# Patient Record
Sex: MALE | Race: WHITE
[De-identification: names, ages, dates, MRNs, and addresses within clinical notes are randomized per-mention and may not be internally consistent; named-entity substitution may affect disease eponyms.]

---

## 2020-02-10 ENCOUNTER — HOSPITAL ENCOUNTER (INPATIENT)
Dept: HOSPITAL 25 - PMRU | Age: 85
LOS: 4 days | Discharge: HOME HEALTH | DRG: 57 | End: 2020-02-14
Attending: PHYSICAL MEDICINE & REHABILITATION | Admitting: PHYSICAL MEDICINE & REHABILITATION
Payer: MEDICARE

## 2020-02-10 DIAGNOSIS — N40.0: ICD-10-CM

## 2020-02-10 DIAGNOSIS — G20: Primary | ICD-10-CM

## 2020-02-10 DIAGNOSIS — Z79.82: ICD-10-CM

## 2020-02-10 DIAGNOSIS — Z95.1: ICD-10-CM

## 2020-02-10 DIAGNOSIS — J11.1: ICD-10-CM

## 2020-02-10 DIAGNOSIS — G47.00: ICD-10-CM

## 2020-02-10 DIAGNOSIS — D69.6: ICD-10-CM

## 2020-02-10 DIAGNOSIS — E78.5: ICD-10-CM

## 2020-02-10 DIAGNOSIS — Z82.49: ICD-10-CM

## 2020-02-10 DIAGNOSIS — I25.10: ICD-10-CM

## 2020-02-10 DIAGNOSIS — Z79.899: ICD-10-CM

## 2020-02-10 DIAGNOSIS — M85.80: ICD-10-CM

## 2020-02-10 PROCEDURE — F08Z1ZZ DRESSING TECHNIQUES TREATMENT: ICD-10-PCS | Performed by: PHYSICAL MEDICINE & REHABILITATION

## 2020-02-10 PROCEDURE — F07Z5ZZ BED MOBILITY TREATMENT: ICD-10-PCS | Performed by: PHYSICAL MEDICINE & REHABILITATION

## 2020-02-10 PROCEDURE — F07Z4ZZ WHEELCHAIR MOBILITY TREATMENT: ICD-10-PCS | Performed by: PHYSICAL MEDICINE & REHABILITATION

## 2020-02-10 PROCEDURE — F07Z8ZZ TRANSFER TRAINING TREATMENT: ICD-10-PCS | Performed by: PHYSICAL MEDICINE & REHABILITATION

## 2020-02-10 PROCEDURE — F08Z2ZZ GROOMING/PERSONAL HYGIENE TREATMENT: ICD-10-PCS | Performed by: PHYSICAL MEDICINE & REHABILITATION

## 2020-02-10 PROCEDURE — 85025 COMPLETE CBC W/AUTO DIFF WBC: CPT

## 2020-02-10 PROCEDURE — F08Z3ZZ FEEDING/EATING TREATMENT: ICD-10-PCS | Performed by: PHYSICAL MEDICINE & REHABILITATION

## 2020-02-10 PROCEDURE — F07Z9ZZ GAIT TRAINING/FUNCTIONAL AMBULATION TREATMENT: ICD-10-PCS | Performed by: PHYSICAL MEDICINE & REHABILITATION

## 2020-02-10 PROCEDURE — 80053 COMPREHEN METABOLIC PANEL: CPT

## 2020-02-10 PROCEDURE — F08Z0ZZ BATHING/SHOWERING TECHNIQUES TREATMENT: ICD-10-PCS | Performed by: PHYSICAL MEDICINE & REHABILITATION

## 2020-02-10 PROCEDURE — 36415 COLL VENOUS BLD VENIPUNCTURE: CPT

## 2020-02-10 RX ADMIN — CARBIDOPA AND LEVODOPA SCH TAB: 25; 100 TABLET ORAL at 17:30

## 2020-02-10 RX ADMIN — DOCUSATE SODIUM SCH: 100 CAPSULE, LIQUID FILLED ORAL at 20:10

## 2020-02-10 RX ADMIN — CARBIDOPA AND LEVODOPA SCH TAB: 25; 100 TABLET ORAL at 12:29

## 2020-02-10 RX ADMIN — Medication SCH UNITS: at 12:29

## 2020-02-10 RX ADMIN — HEPARIN SODIUM SCH UNITS: 5000 INJECTION INTRAVENOUS; SUBCUTANEOUS at 20:10

## 2020-02-10 RX ADMIN — CYANOCOBALAMIN TAB 500 MCG SCH MCG: 500 TAB at 17:30

## 2020-02-10 RX ADMIN — ATORVASTATIN CALCIUM SCH MG: 10 TABLET, FILM COATED ORAL at 20:09

## 2020-02-10 NOTE — HP
ADMISSION HISTORY AND PHYSICAL:

 

DATE OF ADMISSION:  02/10/20

 

HISTORY OF ILLNESS:  Rico Valdovinos is an 84-year-old white male.  He has a 
history of Parkinson's disease which he has had for at least the past 15 years.
  He also has a history of coronary artery disease.  The patient was admitted 
to the hospital on 02/04/20.  He was admitted and tested positive for 
influenza.  He was placed on Tamiflu, which was adjusted for his renal 
function.  He was continued on that for 5 days.  He remained on his usual 
regimen of carbidopa and levodopa but was in bed for a prolonged period of 
time.  He has baseline thrombocytopenia, which worsened with the illness but 
then seemed to improve.  He did receive IV hydration.  He was noted to have 
some difficulty with kidney function while on the acute service.  His 
creatinine is elevated at baseline but when he came in his creatinine was 1.61, 
which improved to 1.24 after hydration.  The patient was weaker than usual and 
had more balance issues because of his prolonged period of bed rest.  He was 
felt to have physical therapy and occupational therapy needs.  He is now being 
admitted for inpatient rehab so he might return to independent living.

 

PAST MEDICAL HISTORY:  Significant for Parkinson's disease.  He also has a 
history of osteopenia, hyperlipidemia, prostatic hypertrophy, and a coronary 
artery bypass graft in 2011.  He follows with Dr. Faust for his heart disease.

 

CURRENT MEDICATIONS:  Include:

1.  Sinemet.

2.  Amantadine.

3.  Baby aspirin every day.

4.  Lipitor.

 

ALLERGIES:  The patient has no known drug allergies.

 

FAMILY HISTORY:  Noncontributory.  He did have a father who had a history of 
coronary artery disease.

 

SOCIAL HISTORY:  He lives in [facility] with his wife. It is a one-
level condo. He is a retired psychologist.

 

REVIEW OF SYSTEMS:  No current shortness of breath or chest pain.

 

                               PHYSICAL EXAMINATION

 

VITAL SIGNS:  The patient's temperature is 97.6, blood pressure is 100/48, 
pulse 56, respirations 18.

 

HEENT:  He does have Parkinson-like facies.

 

NECK:  Supple.

 

LUNGS:  Sounded clear to auscultation bilaterally.

 

HEART:  Heart sounds are regular.  S1 and S2 are audible.

 

ABDOMEN:  Soft and nontender.

 

EXTREMITIES:  He did have perhaps some cogwheeling.  Peripheral pulses were 
intact.

 

NEUROLOGIC:  The patient was awake, alert, oriented.  Muscle strength appeared 
to be close to 5/5.  He did have some difficulty with tremor.

 

FUNCTIONAL EXAM:  He transfers with contact guard to min assist.

 

 ASSESSMENT:

1.  Parkinson's disease.

2.  Influenza.

 

PLAN:  We are going to integrate him into a comprehensive and therapeutic rehab 
program with the following goals:

1.  Physical Therapy will work with the patient.  They are going to work on 
functional transfer training, ambulation training with a walker, work on his 
balance.

2.  Occupational Therapy will see the patient, work on his activities of daily 
living including toileting and toilet transfers.

3.  Heparin for DVT prophylaxis.

4.  We will continue with Sinemet and his amantadine for his Parkinson's 
disease.

5.  Continue aspirin and Lipitor for coronary artery disease.

6.  His bowels will be regulated.

7.  Advance directives:  The patient has a MOLST.  He has DNR.

8.   will be closely involved to make sure that any services and 
equipment that patient requires are in place prior to discharge. 

9.  Family training as appropriate.

10.  Home with appropriate services.

 

ESTIMATED LENGTH OF STAY:  7 to 10 days.

 

 

 

[id number]/CPS #: [id number]

MO

## 2020-02-11 RX ADMIN — AMANTADINE SCH MG: 100 CAPSULE ORAL at 07:37

## 2020-02-11 RX ADMIN — DOCUSATE SODIUM SCH: 100 CAPSULE, LIQUID FILLED ORAL at 19:34

## 2020-02-11 RX ADMIN — CARBIDOPA AND LEVODOPA SCH TAB: 25; 100 TABLET ORAL at 13:06

## 2020-02-11 RX ADMIN — HEPARIN SODIUM SCH UNITS: 5000 INJECTION INTRAVENOUS; SUBCUTANEOUS at 20:44

## 2020-02-11 RX ADMIN — CYANOCOBALAMIN TAB 500 MCG SCH MCG: 500 TAB at 17:21

## 2020-02-11 RX ADMIN — ASPIRIN SCH MG: 81 TABLET, COATED ORAL at 07:38

## 2020-02-11 RX ADMIN — Medication SCH UNITS: at 13:06

## 2020-02-11 RX ADMIN — DOCUSATE SODIUM SCH: 100 CAPSULE, LIQUID FILLED ORAL at 07:39

## 2020-02-11 RX ADMIN — CARBIDOPA AND LEVODOPA SCH TAB: 25; 100 TABLET ORAL at 17:21

## 2020-02-11 RX ADMIN — HEPARIN SODIUM SCH UNITS: 5000 INJECTION INTRAVENOUS; SUBCUTANEOUS at 07:35

## 2020-02-11 RX ADMIN — CARBIDOPA AND LEVODOPA SCH TAB: 25; 100 TABLET ORAL at 07:38

## 2020-02-11 NOTE — PMRUTEAM
PMRU: Team Meeting


Current Status: 


 Physical Therapy: Current Status











Current Rolling Status         Supervision/Touching


 


Current Supine <-> Sit Status  Supervision/Touching


 


Current Sit <-> Stand Status   Partial/Moderate


 


Current Bed <-> Chair Status   Partial/Moderate


 


Transfer/Bed Mobility          Rolling Walker





Recommended Devices            


 


Current Picking Up Object      Partial/Moderate





Status                         


 


Current Car Transfer Status    Partial/Moderate


 


Current Ambulation Assistance  Partial/Moderate





Status                         


 


Ambulation Assistive Device    Rolling Walker


 


Ambulation Conditions          Two or More Turns,Uneven Surfaces


 


Current Ambulation Distance    120'


 


Current Wheelchair Propulsion  Not Applicable





Ability Status                 


 


Current Stair Climbing Status  Supervision/Touching


 


Stair Climbing Assistive       Left Railing,Right Railing





Devices                        


 


Number of Stairs Climbed       10


 


Current Curb Assistance Status Supervision/Touching














 Occupational Therapy: Current Status











Current Upper Body Dressing    Partial/Moderate





Status                         


 


Current Lower Body Dressing    Partial/Moderate





Status                         


 


Current Footwear Status        Partial/Moderate


 


Current Grooming Status        Setup or Clean-up Assist


 


Current Toileting Status       Supervision/Touching


 


Current Toilet Transfer Status Supervision/Touching


 


Current Eating Status          Setup or Clean-up Assist














 Nursing: Current Status











Skin Deviations [Bilateral     Other





Buttocks]                      


 


Skin Deviation Description [   intact





Bilateral Buttocks]            


 


Bladder Current Status         voiding in br


 


Bowel Current Status           last bm 2/10/20. declined colace


 


Nutrition Current Status       appetite good


 


Medication Current Status      no pain meds given. requests sinemet to given on





 time














 Rec Therapy: Current Status











Summary of Assessment and      Recreation therapy services will be introduce





Clinical Impression            today 2/11/20














 Nutrition: Current Status











Monitoring                     new adm; full nutrition assessment planned 2/17





 per NDS protocol, unless otherwise requested.





 Tentative nutrition goals as outlined below.

















Goals: 


 Physical Therapy:  Goals











Goals to Be Accomplished in (  7-10





Days)                          


 


Goal: Rolling Assistance       Independent


 


Goal Supine <-> Sit Status     Independent


 


Goal Sit <-> Stand Status      Independent


 


Goal Bed <-> Chair Status      Independent


 


Transfer/Bed Mobility          Rolling Walker





Recommended Devices            


 


Goal: Picking Up Object        Independent


 


Goal: Car Transfer Status      Setup or Clean-up Assist


 


Goal: Ambulation Assistance    Independent


 


Ambulation Assistive Devices   Rolling Walker


 


Ambulation Distance (ft)       150'


 


Goal: Wheelchair Propulsion    Not Applicable





Ability                        


 


Goal: Stairs Assistance        Independent


 


Stairs Recommended Devices     Two Rails


 


Number of Stairs               12


 


Goal: Curb Assistance          Independent


 


Goal: Home Exercise Program    Independent





Assistance                     














 Occupational Therapy: Goals











Goals to be Completed in (Days 7





)                              


 


Goal Upper Body Dressing       Independent





Routine                        


 


Goal Lower Body Dressing       Independent





Routine                        


 


Goal Footwear Status           Independent


 


Goal Bathing Routine (OT)      Supervision/Touching


 


Goal Grooming Routine          Independent


 


Goal Toilet Hygiene and        Independent





Clothing Management Routine    


 


Goal Toilet Transfer Routine   Independent


 


Goal Functional Transfers for  Independent





ADL                            


 


Goal Feeding Routine           Independent














 Nutrition: Goals











Intervention Goals             1.  adequate po intake to support hydration and





 lean body mass without wt loss





 2.  pt will tolerate least-restrictive diet





 texture without difficulty chewing or swallowing





 3.  maintain serum electrolytes WNL





 4.  regulation of bowel pattern; no c/o





 constipation (or diarrhea)





 














 Nursing: Goals











Bladder Goal                   independent


 


Bowel Goal                     independent.


 


Nutrition Goal                 100% of all meals consumed


 


Medication Goal                independent

















Care Plan: 


 Care Plan





ADL's - Improve/Maintain                Start:  02/10/20 11:53              


Freq:   DAILY@0700,1900                 Status: Active      Target: 02/11/20


Protocol:                                                                   








Activity Type Activity Date Activity User E-Sign Co-Sign Detail





Recorded Client Recorded Date Recorded By   


 


Document 02/10/20 15:48 [id number]   





PMRU-C09 02/10/20 15:48 [id number]   














  02/10/20





  15:48


 


PMRU Outcome: ADL's/ADL Transfers 


 


Orders/Interventions Occupational





 Therapy





 Evaluation &





 Treatment





 Communication





 Tool in Patient





 Room


 


Device Yes


 


Address Deficits Secondary To: deconditioning,





 h/o Parkinson'





 s


 


Patient to receive OT 5x/wk for  Therex





min/day Self Care





 Management





 Group Therapy





 Neuromuscular





 ReEducation


 


UE/LE ADL's with Assist Yes: Omar





 except S for





 bathing


 


ADL Transfers with Assist Yes: Omar


 


Toileting: Transfers,Clothing Management Yes: Omar





,Hygeine w/Assist 


 


Other Outcome/Goals Pt participated





 well in





 treatment





 session,





 reports





 normally using





 4# dumbells





 when working





 out, however





 reported





 feeling very





 challenged





 during session





 using 2#





 dumbells. Will





 attempt to work





 pt back to





 normal routine





 as pt able to





 tolerate.


 


Progression Toward Outcome/Goals Goal Initiation








Cardiovascular- Improve/Maintain        Start:  02/10/20 11:41              


Freq:   DAILY@0700,1900                 Status: Active      Target: 02/17/20


Protocol:                                                                   








Activity Type Activity Date Activity User E-Sign Co-Sign Detail





Recorded Client Recorded Date Recorded By   


 


Document 02/11/20 08:00 [id number]   





PMRU-C14 02/11/20 09:21 [id number]   














  02/11/20





  08:00


 


PMRU Outcome: Cardiovascular 


 


Vital Signs q Shift for 48hrs Then BID Yes


 


Daily Weight Ordered No


 


Current Cardiovascular Outcome/Goal Maintain/





 Achieve





 Baseline HR, BP





 , Perfusion





 Improve HR





 Within





 Prescribed





 Parameters





 Maintain/





 Improve





 Perfusion





 Free of





 Abnormal





 Cardiac





 Symptoms


 


Progression Toward Outcome/Goal Progressing








DVT Prophylaxis- Improve/Maintain       Start:  02/10/20 11:41              


Freq:   DAILY@0700,1900                 Status: Active      Target: 02/17/20


Protocol:                                                                   








Activity Type Activity Date Activity User E-Sign Co-Sign Detail





Recorded Client Recorded Date Recorded By   


 


Document 02/11/20 08:00 [id number]   





PMRU-C14 02/11/20 09:21 [id number]   














  02/11/20





  08:00


 


PMRU Outcome: DVT Prophylaxis 


 


Current DVT Outcome/Goals Remains Free of





 DVT





 Complies with





 DVT Prophylaxis





 /Treatment





 Demonstrates





 Knowledge of





 DVT Prevention/





 Treatment





 TEDS Stockings





 on Every AM,





 Off at HS


 


Other DVT Other Outcome/Goals heparin given





 this am per





 order


 


Progression Toward Outcome/Goals Progressing








Discharge Planning - Improve/Maintain   Start:  02/10/20 11:41              


Freq:   DAILY@0700,1900                 Status: Active      Target: 02/17/20


Protocol:                                                                   








Activity Type Activity Date Activity User E-Sign Co-Sign Detail





Recorded Client Recorded Date Recorded By   


 


Document 02/11/20 08:00 [id number]   





PMRU-C14 02/11/20 09:21 [id number]   














  02/11/20





  08:00


 


PMRU Outcome: Discharge Planning 


 


Current Discharge Planning Outcome/Goals Demonstrates





 Understanding





 of Discharge





 Plan





 Homecare





 Referral - See





 Comment


 


Progression Toward Outcome/Goals Progressing








Education-Improve/Maintain              Start:  02/10/20 11:41              


Freq:   DAILY@0700,1900                 Status: Active      Target: 02/17/20


Protocol:                                                                   








Activity Type Activity Date Activity User E-Sign Co-Sign Detail





Recorded Client Recorded Date Recorded By   


 


Document 02/11/20 08:00 [id number]   





PMRU-C14 02/11/20 09:21 [id number]   














  02/11/20





  08:00


 


PMRU Outcome: Education 


 


Current Education Outcome/Goals Demonstrate/





 Verbalize





 Understanding





 of Written





 Discharge





 Instructions





 Demonstrates





 Skills





 Encourage





 Questions


 


Progression Toward Outcome/Goals Progressing








/GI-Improve/Maintain                  Start:  02/10/20 11:41              


Freq:   DAILY@0700,1900                 Status: Active      Target: 02/17/20


Protocol:                                                                   








Activity Type Activity Date Activity User E-Sign Co-Sign Detail





Recorded Client Recorded Date Recorded By   


 


Document 02/11/20 08:00 [id number]   





PMRU-C14 02/11/20 09:21 [id number]   














  02/11/20





  08:00


 


PMRU Outcome: Genitourinary/ 





Gastrointestinal 


 


Current Gastrointestinal Outcome/Goals Maintain/





 Achieve Bowel





 Regularity in





 Accordance with





 Pt's Baseline





 Remain Free of





 Emesis





 Prevent





 Constipation





 Laxatives as





 Ordered


 


Other GI Outcome/Goals declined bowel





 meds


 


Progression Toward Outcome/Goals Progressing


 


Current Genitourinary Outcome/Goals Maintain/





 Achieve Urinary





 Continence





 Maintain/





 Achieve





 Adequate





 Urinary Output





 Remain Free of





 Hospital-





 Acquired UTI


 


Progression Toward Outcome/Goals Progressing








Medication Administration               Start:  02/10/20 11:41              


Freq:   DAILY@0700,1900                 Status: Active      Target: 02/17/20


Protocol:                                                                   








Activity Type Activity Date Activity User E-Sign Co-Sign Detail





Recorded Client Recorded Date Recorded By   


 


Document 02/11/20 08:00 [id number]   





PMRU-C14 02/11/20 09:21 [id number]   














  02/11/20





  08:00


 


PMRU Outcome: Medication Administration 


 


Assess Patient Knowledge/Teach Med Yes





Education for all Meds 


 


Current Med Admin Outcome/Goals Patient





 Independent





 with Medication





 Administration





 at Home





 Demonstrates





 Understanding


 


Progression Towards Outcome/Goals Progressing


 


Is Patient Going Home on Lovenox? No








Mobility- Improve/Maintain              Start:  02/10/20 15:35              


Freq:   DAILY@0700,1900                 Status: Active      Target: 02/11/20


Protocol:                                                                   








Activity Type Activity Date Activity User E-Sign Co-Sign Detail





Recorded Client Recorded Date Recorded By   


 


Document 02/10/20 15:35 [id number]   





PMRU-C07 02/10/20 15:36 [id number]   














  02/10/20





  15:35


 


PMRU Outcome: Mobility 


 


Physical Therapy Evaluation and Yes





Treatment 


 


Activity OOB with Assistance Yes


 


WBAT Yes


 


NWB No


 


TTWB No


 


Device Yes


 


Assistance Yes


 


Patient to be seen 5x/wk for  min/ Therex





day for: Mobility





 Training





 Gait Training





 Balance





 Other


 


Other Therapy Comment Discharge





 training,





 discharge





 planning


 


Current Mobility Outcome/Goals Maintain/





 Achieve





 Baseline





 Mobility Status





 Improve





 Mobility Status





 Demonstrates





 Proper Use of





 Assistive





 Devices





 Free from





 Complications





 of Immobility


 


Progression Toward Outcome/Goals Goal Initiation


 


Bed Mobility Yes:





 Independent


 


Transfers Yes:





 Independent





 with RW


 


Gait x ft Yes: 150'





 Independent





 with RW


 


W/C Mobility x ft No


 


Up/Down Stairs Yes: 12 with 2





 rails





 Independent


 


With HEP Yes:





 Independent








Neurological- Improve/Maintain          Start:  02/10/20 11:41              


Freq:   DAILY@0700,1900                 Status: Active      Target: 02/17/20


Protocol:                                                                   








Activity Type Activity Date Activity User E-Sign Co-Sign Detail





Recorded Client Recorded Date Recorded By   


 


Document 02/11/20 08:00 [id number]   





PMRU-C14 02/11/20 09:21 [id number]   














  02/11/20





  08:00


 


PMRU Outcome: Neurological 


 


Weakness/Aphasia Weakness


 


Current Neurological Outcome/Goals Maintain/





 Achieve





 Baseline





 Neurological





 Status





 Improve





 Neurological





 Status





 Prevent





 Avoidable





 Neurological





 Decline





 Demonstrate





 Knowledge of





 Prevention/Tx





 of Neuro





 Disorders/





 Complication





 Maintain/





 Improve





 Strength/ROM


 


Progression Toward Outcome/Goals Progressing








Rec Therapy- Improve/Maintain           Start:  02/10/20 16:47              


Freq:   DAILY@0700,1900                 Status: Active      Target: 02/11/20


Protocol:                                                                   








Activity Type Activity Date Activity User E-Sign Co-Sign Detail





Recorded Client Recorded Date Recorded By   


 


Document 02/10/20 16:47 [id number]   





BSU-C08 02/10/20 16:47 [id number]   














  02/10/20





  16:47


 


PMRU Outcome: Recreation Therapy 


 


Current Rec Ther Outcome/Goals Complete Rec





 Therapy





 Assessment





 Meet with





 Patient





 Regularly for





 Support





 Encourage





 Leisure





 Involvement


 


Progression Toward Outcome/Goals Goal Initiation








Respiratory - Improve/Maintain          Start:  02/10/20 11:41              


Freq:   DAILY@0700,1900                 Status: Active      Target: 02/17/20


Protocol:                                                                   








Activity Type Activity Date Activity User E-Sign Co-Sign Detail





Recorded Client Recorded Date Recorded By   


 


Document 02/11/20 08:00 [id number]   





PMRU-C14 02/11/20 09:21 [id number]   














  02/11/20





  08:00


 


PMRU Outcome: Respiratory 


 


Does Patient Have a Trach No


 


Current Respiratory Outcome/Goals Maintain/





 Improve





 Baseline





 Respiratory





 Status





 Maintain/





 Improve





 Activity





 Tolerance





 Prevent





 Pneumonia/





 Atelectasis


 


Other Respiratory Outcome/Goals encouraged IS


 


Progression Toward Outcome/Goals Progressing








Safety- Improve/Maintain                Start:  02/10/20 11:07              


Freq:   DAILY@0700,1900                 Status: Active      Target: 02/17/20


Protocol:                                                                   








Activity Type Activity Date Activity User E-Sign Co-Sign Detail





Recorded Client Recorded Date Recorded By   


 


Document 02/11/20 08:00 [id number]   





PMRU-C14 02/11/20 09:21 [id number]   














  02/11/20





  08:00


 


PMRU Outcome: Safety 


 


Current Safety Outcome/Goals Remain Free of





 Injury or Harm





 Cooperates with





 Safety





 Measures for





 Least





 Restrictive





 Environment





 Prevent Falls/





 Injury


 


Progression Toward Outcome/Goals Progressing

















- Interdisciplinary Staff Present


/Social Work Staff Present: Thelma Britton LMSW


Nursing Staff Present: Anshul Boyle RN


OT Staff Present: Radha Carlos


PT Staff Present: Su Easley


Rec Therapy Staff Present: Janay Larry


Medicine Note: 








Length of Stay:  3 days





Anticipated Discharge Destination: 





Tentative Discharge Date:  02/14/20





Discharged to:  Home

## 2020-02-11 NOTE — PN
Progress Note


Date of Service: 02/11/20


Note: 


[facility] was visited. Therapy notes read and reviewed. He was discussed 

in interdisciplinary team rounds. He is getting back to his baseline but he has 

trouble with turns








Current Medications: 


 Active Medications











Generic Name Dose Route Start Last Admin





  Trade Name Freq  PRN Reason Stop Dose Admin


 


Acetaminophen  650 mg  02/10/20 12:00  





  Tylenol Tab*  PO   





  Q6H PRN   





  MILD PAIN or TEMP > 100.4   





     





     





     


 


Amantadine HCl  100 mg  02/11/20 07:30  02/11/20 07:37





  Symmetrel Cap*  PO   100 mg





  0730 DAVID   Administration





     





     





     





     


 


Aspirin  81 mg  02/11/20 09:00  02/11/20 07:38





  Aspirin Ec Tab*  PO   81 mg





  DAILY DAVID   Administration





     





     





     





     


 


Atorvastatin Calcium  10 mg  02/10/20 21:00  02/10/20 20:09





  Lipitor*  PO   10 mg





  Q48H DAVID   Administration





     





     





     





     


 


Carbidopa/Levodopa  1 tab  02/10/20 17:30  02/11/20 17:21





  Sinemet 25/100 Tab(*)  PO   1 tab





  1730 DAVID   Administration





     





     





     





     


 


Carbidopa/Levodopa  2 tab  02/10/20 12:30  02/11/20 13:06





  Sinemet 25/100 Tab(*)  PO   2 tab





  0730,1230 DAVID   Administration





     





     





     





     


 


Cholecalciferol  1,000 units  02/10/20 12:30  02/11/20 13:06





  Vitamin D Tab*  PO   1,000 units





  1230 DAVID   Administration





     





     





     





     


 


Cyanocobalamin  1,000 mcg  02/10/20 17:30  02/11/20 17:21





  Vitamin B12 Tab*  PO   1,000 mcg





  1730 DAVID   Administration





     





     





     





     


 


Docusate Sodium  100 mg  02/10/20 21:00  02/11/20 19:34





  Colace Cap*  PO   Not Given





  BID DAVID   





     





     





     





     


 


Heparin Sodium (Porcine)  5,000 units  02/10/20 21:00  02/11/20 07:35





  Heparin Vial(*)  SUBCUT   5,000 units





  BID DAVID   Administration





     





     





     





     


 


Magnesium Hydroxide  30 ml  02/10/20 12:00  





  Milk Of Magnesia Liq*  PO   





  Q6H PRN   





  CONSTIPATION   





     





     





     


 


Senna  2 tab  02/10/20 12:00  





  Senokot 8.6 Mg Tab*  PO   





  BEDTIME PRN   





  CONSTIPATION   





     





     





     











Vital Signs: 


 Vital Signs











Temp Pulse Resp BP Pulse Ox


 


 97.5 F   65   16   111/63   99 


 


 02/11/20 14:26  02/11/20 14:26  02/11/20 14:26  02/11/20 14:26  02/11/20 18:02











Exam: 





HEENT: Parkinson facies


LUNGS: Clear


HEART: Reg rhythm


ABDOMEN: Soft


EXTRMITIES: Increased tone. Tremor in both arms


NEUROLOGIC: Alert and oriented. Sensation intact. Strength close to 5/5


Assessment/Plan: 





1. Parkinson's Disease: Sinemet/Amantadine. PT/OT. Follow up with Dr. Nava


2. Coronary Artery Disease: Lipitor/ASA


3. DVT Prophylaxis: Heparin S/Q


4. Advance Directives: DNR. Has MOLST


5. Thrombocytopenia: Check labs in am


6. Influenza: Received Tamiflu














02/11/20 19:47





02/11/20 19:48

## 2020-02-12 LAB
ALBUMIN SERPL BCG-MCNC: 3.5 G/DL (ref 3.2–5.2)
ALBUMIN/GLOB SERPL: 1.2 {RATIO} (ref 1–3)
ALP SERPL-CCNC: 42 U/L (ref 34–104)
ALT SERPL W P-5'-P-CCNC: 16 U/L (ref 7–52)
ANION GAP SERPL CALC-SCNC: 2 MMOL/L (ref 2–11)
AST SERPL-CCNC: 24 U/L (ref 13–39)
BASOPHILS # BLD AUTO: 0 10^3/UL (ref 0–0.2)
BUN SERPL-MCNC: 24 MG/DL (ref 6–24)
BUN/CREAT SERPL: 16.4 (ref 8–20)
CALCIUM SERPL-MCNC: 9.2 MG/DL (ref 8.6–10.3)
CHLORIDE SERPL-SCNC: 106 MMOL/L (ref 101–111)
EOSINOPHIL # BLD AUTO: 0.2 10^3/UL (ref 0–0.6)
GLOBULIN SER CALC-MCNC: 2.9 G/DL (ref 2–4)
GLUCOSE SERPL-MCNC: 86 MG/DL (ref 70–100)
HCO3 SERPL-SCNC: 31 MMOL/L (ref 22–32)
HCT VFR BLD AUTO: 41 % (ref 42–52)
HGB BLD-MCNC: 13.8 G/DL (ref 14–18)
LYMPHOCYTES # BLD AUTO: 1.3 10^3/UL (ref 1–4.8)
MCH RBC QN AUTO: 31 PG (ref 27–31)
MCHC RBC AUTO-ENTMCNC: 34 G/DL (ref 31–36)
MCV RBC AUTO: 93 FL (ref 80–94)
MONOCYTES # BLD AUTO: 0.6 10^3/UL (ref 0–0.8)
NEUTROPHILS # BLD AUTO: 2.4 10^3/UL (ref 1.5–7.7)
NRBC # BLD AUTO: 0 10^3/UL
NRBC BLD QL AUTO: 0.1
PLATELET # BLD AUTO: 208 10^3/UL (ref 150–450)
POTASSIUM SERPL-SCNC: 4.4 MMOL/L (ref 3.5–5)
PROT SERPL-MCNC: 6.4 G/DL (ref 6.4–8.9)
RBC # BLD AUTO: 4.38 10^6 /UL (ref 4.18–5.48)
SODIUM SERPL-SCNC: 139 MMOL/L (ref 135–145)
WBC # BLD AUTO: 4.6 10^3/UL (ref 3.5–10.8)

## 2020-02-12 RX ADMIN — AMANTADINE SCH MG: 100 CAPSULE ORAL at 08:12

## 2020-02-12 RX ADMIN — ASPIRIN SCH MG: 81 TABLET, COATED ORAL at 08:13

## 2020-02-12 RX ADMIN — HEPARIN SODIUM SCH UNITS: 5000 INJECTION INTRAVENOUS; SUBCUTANEOUS at 20:42

## 2020-02-12 RX ADMIN — ATORVASTATIN CALCIUM SCH MG: 10 TABLET, FILM COATED ORAL at 20:41

## 2020-02-12 RX ADMIN — CARBIDOPA AND LEVODOPA SCH TAB: 25; 100 TABLET ORAL at 08:12

## 2020-02-12 RX ADMIN — CARBIDOPA AND LEVODOPA SCH TAB: 25; 100 TABLET ORAL at 17:55

## 2020-02-12 RX ADMIN — Medication SCH UNITS: at 13:05

## 2020-02-12 RX ADMIN — CARBIDOPA AND LEVODOPA SCH TAB: 25; 100 TABLET ORAL at 13:05

## 2020-02-12 RX ADMIN — DOCUSATE SODIUM SCH: 100 CAPSULE, LIQUID FILLED ORAL at 19:31

## 2020-02-12 RX ADMIN — CYANOCOBALAMIN TAB 500 MCG SCH MCG: 500 TAB at 17:55

## 2020-02-12 RX ADMIN — HEPARIN SODIUM SCH UNITS: 5000 INJECTION INTRAVENOUS; SUBCUTANEOUS at 08:13

## 2020-02-12 RX ADMIN — DOCUSATE SODIUM SCH: 100 CAPSULE, LIQUID FILLED ORAL at 08:13

## 2020-02-12 NOTE — PN
Progress Note


Date of Service: 02/12/20


Note: 


[facility] was visited. Therapy notes read and reviewed. Henrry would like 

to try melatonin at bedtime for sleep. He thinks his walking is steadier. He 

was able able to walk today without his walking sticks. 








Current Medications: 


 Active Medications











Generic Name Dose Route Start Last Admin





  Trade Name Freq  PRN Reason Stop Dose Admin


 


Acetaminophen  650 mg  02/10/20 12:00  





  Tylenol Tab*  PO   





  Q6H PRN   





  MILD PAIN or TEMP > 100.4   





     





     





     


 


Amantadine HCl  100 mg  02/11/20 07:30  02/12/20 08:12





  Symmetrel Cap*  PO   100 mg





  0730 DAVID   Administration





     





     





     





     


 


Aspirin  81 mg  02/11/20 09:00  02/12/20 08:13





  Aspirin Ec Tab*  PO   81 mg





  DAILY DAVID   Administration





     





     





     





     


 


Atorvastatin Calcium  10 mg  02/10/20 21:00  02/10/20 20:09





  Lipitor*  PO   10 mg





  Q48H DAVID   Administration





     





     





     





     


 


Carbidopa/Levodopa  1 tab  02/10/20 17:30  02/12/20 17:55





  Sinemet 25/100 Tab(*)  PO   1 tab





  1730 DAVID   Administration





     





     





     





     


 


Carbidopa/Levodopa  2 tab  02/10/20 12:30  02/12/20 13:05





  Sinemet 25/100 Tab(*)  PO   2 tab





  0730,1230 DAVID   Administration





     





     





     





     


 


Cholecalciferol  1,000 units  02/10/20 12:30  02/12/20 13:05





  Vitamin D Tab*  PO   1,000 units





  1230 DAVID   Administration





     





     





     





     


 


Cyanocobalamin  1,000 mcg  02/10/20 17:30  02/12/20 17:55





  Vitamin B12 Tab*  PO   1,000 mcg





  1730 DAVID   Administration





     





     





     





     


 


Docusate Sodium  100 mg  02/10/20 21:00  02/12/20 08:13





  Colace Cap*  PO   Not Given





  BID DAVID   





     





     





     





     


 


Heparin Sodium (Porcine)  5,000 units  02/10/20 21:00  02/12/20 08:13





  Heparin Vial(*)  SUBCUT   5,000 units





  BID DAVID   Administration





     





     





     





     


 


Magnesium Hydroxide  30 ml  02/10/20 12:00  





  Milk Of Magnesia Liq*  PO   





  Q6H PRN   





  CONSTIPATION   





     





     





     


 


Senna  2 tab  02/10/20 12:00  





  Senokot 8.6 Mg Tab*  PO   





  BEDTIME PRN   





  CONSTIPATION   





     





     





     











Vital Signs: 


 Vital Signs











Temp Pulse Resp BP Pulse Ox


 


 97.8 F   64   22   102/45   98 


 


 02/12/20 15:05  02/12/20 16:07  02/12/20 15:05  02/12/20 15:05  02/12/20 16:11











Lab Results: 


 Laboratory Results - last 24 hr











  02/12/20 02/12/20





  04:49 04:49


 


WBC  4.6 


 


RBC  4.38 


 


Hgb  13.8 L 


 


Hct  41 L 


 


MCV  93 


 


MCH  31 


 


MCHC  34 


 


RDW  14 


 


Plt Count  208 


 


MPV  9.8 


 


Neut % (Auto)  52.5 


 


Lymph % (Auto)  29.2 


 


Mono % (Auto)  12.9 


 


Eos % (Auto)  5.0 


 


Baso % (Auto)  0.4 


 


Absolute Neuts (auto)  2.4 


 


Absolute Lymphs (auto)  1.3 


 


Absolute Monos (auto)  0.6 


 


Absolute Eos (auto)  0.2 


 


Absolute Basos (auto)  0.0 


 


Absolute Nucleated RBC  0.0 


 


Nucleated RBC %  0.1 


 


Sodium   139


 


Potassium   4.4


 


Chloride   106


 


Carbon Dioxide   31


 


Anion Gap   2


 


BUN   24


 


Creatinine   1.46 H


 


Est GFR ( Amer)   55.7


 


Est GFR (Non-Af Amer)   46.0


 


BUN/Creatinine Ratio   16.4


 


Glucose   86


 


Calcium   9.2


 


Total Bilirubin   0.50


 


AST   24


 


ALT   16


 


Alkaline Phosphatase   42


 


Total Protein   6.4


 


Albumin   3.5


 


Globulin   2.9


 


Albumin/Globulin Ratio   1.2











Exam: 





HEENT: Parkinson facies


LUNGS: Clear


HEART: Reg rhythm


ABDOMEN: Soft


EXTRMITIES: Increased tone. Tremor in both arms


NEUROLOGIC: Alert and oriented. Sensation intact. Strength close to 5/5


Assessment/Plan: 





1. Parkinson's Disease: Sinemet/Amantadine. PT/OT. Follow up with Dr. Nava


2. Coronary Artery Disease: Lipitor/ASA


3. DVT Prophylaxis: Heparin S/Q


4. Advance Directives: DNR. Has MOLST


5. Thrombocytopenia: Check labs in am


6. Influenza: Received Tamiflu


7. Insomnia: Try melatonin











02/12/20 18:17

## 2020-02-13 VITALS — DIASTOLIC BLOOD PRESSURE: 46 MMHG | SYSTOLIC BLOOD PRESSURE: 111 MMHG

## 2020-02-13 RX ADMIN — DOCUSATE SODIUM SCH: 100 CAPSULE, LIQUID FILLED ORAL at 08:15

## 2020-02-13 RX ADMIN — AMANTADINE SCH MG: 100 CAPSULE ORAL at 08:15

## 2020-02-13 RX ADMIN — CARBIDOPA AND LEVODOPA SCH TAB: 25; 100 TABLET ORAL at 08:15

## 2020-02-13 RX ADMIN — CARBIDOPA AND LEVODOPA SCH TAB: 25; 100 TABLET ORAL at 12:49

## 2020-02-13 RX ADMIN — CYANOCOBALAMIN TAB 500 MCG SCH MCG: 500 TAB at 17:26

## 2020-02-13 RX ADMIN — CARBIDOPA AND LEVODOPA SCH TAB: 25; 100 TABLET ORAL at 17:26

## 2020-02-13 RX ADMIN — HEPARIN SODIUM SCH UNITS: 5000 INJECTION INTRAVENOUS; SUBCUTANEOUS at 08:15

## 2020-02-13 RX ADMIN — HEPARIN SODIUM SCH UNITS: 5000 INJECTION INTRAVENOUS; SUBCUTANEOUS at 21:05

## 2020-02-13 RX ADMIN — ASPIRIN SCH MG: 81 TABLET, COATED ORAL at 08:15

## 2020-02-13 RX ADMIN — DOCUSATE SODIUM SCH: 100 CAPSULE, LIQUID FILLED ORAL at 21:05

## 2020-02-13 RX ADMIN — Medication SCH UNITS: at 12:49

## 2020-02-13 NOTE — PN
Progress Note


Date of Service: 02/13/20


Note: 


[facility] was visited. Therapy notes read and reviewed. He feels like he 

is ready for discharge tomorrow and does not need any additional equipment. 








Current Medications: 


 Active Medications











Generic Name Dose Route Start Last Admin





  Trade Name Freq  PRN Reason Stop Dose Admin


 


Acetaminophen  650 mg  02/10/20 12:00  





  Tylenol Tab*  PO   





  Q6H PRN   





  MILD PAIN or TEMP > 100.4   





     





     





     


 


Amantadine HCl  100 mg  02/11/20 07:30  02/13/20 08:15





  Symmetrel Cap*  PO   100 mg





  0730 DAVID   Administration





     





     





     





     


 


Aspirin  81 mg  02/11/20 09:00  02/13/20 08:15





  Aspirin Ec Tab*  PO   81 mg





  DAILY DAVID   Administration





     





     





     





     


 


Atorvastatin Calcium  10 mg  02/10/20 21:00  02/12/20 20:41





  Lipitor*  PO   10 mg





  Q48H DAVID   Administration





     





     





     





     


 


Carbidopa/Levodopa  1 tab  02/10/20 17:30  02/13/20 17:26





  Sinemet 25/100 Tab(*)  PO   1 tab





  1730 DAVID   Administration





     





     





     





     


 


Carbidopa/Levodopa  2 tab  02/10/20 12:30  02/13/20 12:49





  Sinemet 25/100 Tab(*)  PO   2 tab





  0730,1230 DAVID   Administration





     





     





     





     


 


Cholecalciferol  1,000 units  02/10/20 12:30  02/13/20 12:49





  Vitamin D Tab*  PO   1,000 units





  1230 DAVID   Administration





     





     





     





     


 


Cyanocobalamin  1,000 mcg  02/10/20 17:30  02/13/20 17:26





  Vitamin B12 Tab*  PO   1,000 mcg





  1730 DAVID   Administration





     





     





     





     


 


Docusate Sodium  100 mg  02/10/20 21:00  02/13/20 08:15





  Colace Cap*  PO   Not Given





  BID DAVID   





     





     





     





     


 


Heparin Sodium (Porcine)  5,000 units  02/10/20 21:00  02/13/20 08:15





  Heparin Vial(*)  SUBCUT   5,000 units





  BID DAVID   Administration





     





     





     





     


 


Magnesium Hydroxide  30 ml  02/10/20 12:00  





  Milk Of Magnesia Liq*  PO   





  Q6H PRN   





  CONSTIPATION   





     





     





     


 


Melatonin  3 mg  02/12/20 18:18  02/12/20 20:41





  Melatonin  PO   3 mg





  BEDTIME PRN   Administration





  INSOMNIA   





     





     





     


 


Senna  2 tab  02/10/20 12:00  





  Senokot 8.6 Mg Tab*  PO   





  BEDTIME PRN   





  CONSTIPATION   





     





     





     











Vital Signs: 


 Vital Signs











Temp Pulse Resp BP Pulse Ox


 


 98.3 F   64   22   111/46   98 


 


 02/13/20 16:55  02/13/20 16:55  02/13/20 16:55  02/13/20 16:55  02/13/20 17:31











Exam: 





HEENT: Parkinson facies


LUNGS: Clear


HEART: Reg rhythm


ABDOMEN: Soft


EXTRMITIES: Increased tone. Tremor in both arms


NEUROLOGIC: Alert and oriented. Sensation intact. Strength close to 5/5


Assessment/Plan: 





1. Parkinson's Disease: Sinemet/Amantadine. PT/OT. Follow up with Dr. Nava


2. Coronary Artery Disease: Lipitor/ASA


3. DVT Prophylaxis: Heparin S/Q


4. Advance Directives: DNR. Has MOLST


5. Thrombocytopenia: Platelets normal


6. Influenza: Received Tamiflu


7. Insomnia: Try melatonin














02/13/20 18:54





02/13/20 18:54

## 2020-02-14 RX ADMIN — CARBIDOPA AND LEVODOPA SCH TAB: 25; 100 TABLET ORAL at 07:18

## 2020-02-14 RX ADMIN — AMANTADINE SCH MG: 100 CAPSULE ORAL at 07:18

## 2020-02-14 RX ADMIN — DOCUSATE SODIUM SCH MG: 100 CAPSULE, LIQUID FILLED ORAL at 07:19

## 2020-02-14 RX ADMIN — ASPIRIN SCH MG: 81 TABLET, COATED ORAL at 07:18

## 2020-02-14 RX ADMIN — HEPARIN SODIUM SCH UNITS: 5000 INJECTION INTRAVENOUS; SUBCUTANEOUS at 07:19
